# Patient Record
Sex: FEMALE | Race: WHITE | NOT HISPANIC OR LATINO | URBAN - METROPOLITAN AREA
[De-identification: names, ages, dates, MRNs, and addresses within clinical notes are randomized per-mention and may not be internally consistent; named-entity substitution may affect disease eponyms.]

---

## 2017-11-28 ENCOUNTER — ALLSCRIPTS OFFICE VISIT (OUTPATIENT)
Dept: OTHER | Facility: OTHER | Age: 27
End: 2017-11-28

## 2017-11-29 NOTE — PROGRESS NOTES
Assessment    1  Exercise-induced asthma (493 81) (J45 990)   2  Birth control (V25 9) (Z30 9)   3  Viral URI (465 9) (J06 9,B97 89)    Plan  Birth control    · MedroxyPROGESTERone Acetate 150 MG/ML Intramuscular Suspension(Depo-Provera); INJECT EVERY 12 WEEKS AS DIRECTED    Discussion/Summary  Discussion Summary:   1  viral URI - fluids, rest, nasal saline spray, NyQuil to sleep, reassurance  birth control - will obtain PAP from Sep 2016 and assuming normal will repeat in Sep 2018  Depo renewed and will give here when brought in  EIA -in college, recently asymptomatic  as neededSpet 2018 for PAP  Medication SE Review and Pt Understands Tx: Possible side effects of new medications were reviewed with the patient/guardian today  The treatment plan was reviewed with the patient/guardian  The patient/guardian understands and agrees with the treatment plan      Chief Complaint  Chief Complaint Free Text Note Form: Pt presents here as a np to be established;  due      History of Present Illness  HPI: Patient here to establish care  with a cold a few days ago, 4 days  Stuffy nose, runny eyes, few coughs, laryngitis, denies fever, chills  Denies n/v/d  Clear mucus  Taking NyQuil with relief  Depo, last PAP Sept 2016 in [place] which was normal  Needs refill and injection of Depo  Active Problems  1  Birth control (V25 9) (Z30 9)    Past Medical History  Active Problems And Past Medical History Reviewed: The active problems and past medical history were reviewed and updated today  Surgical History  1  History of Tonsillectomy  Surgical History Reviewed: The surgical history was reviewed and updated today  Family History  Mother    1  Family history of Diverticulitis   2  Denied: Family history of substance abuse   3  Denied: Family history of Mental health problem  Father    4  Family history of hypertension (V17 49) (Z82 49)   5  Denied: Family history of substance abuse   6   Denied: Family history of Mental health problem  Family History Reviewed: The family history was reviewed and updated today  Social History     · Alcohol use (V49 89) (Z78 9)   · Always uses seat belt   · Daily caffeine consumption, 2-3 servings a day   · Has smoke detectors   · Never a smoker   · No illicit drug use  Social History Reviewed: The social history was reviewed and updated today  The social history was reviewed and is unchanged  Current Meds   1  Depo-Provera 150 MG/ML Intramuscular Suspension; INJECT EVERY 12 WEEKS AS DIRECTED; Therapy: (Recorded:28Nov2017) to Recorded    Allergies  1  No Known Drug Allergies  2  No Known Environmental Allergies   3  No Known Food Allergies    Vitals  Vital Signs    Recorded: [id number] 04:54PM   Heart Rate 62   Respiration 14   Systolic 833   Diastolic 70   Height 5 ft 7 75 in   Weight 135 lb 8 0 oz   BMI Calculated 20 76   BSA Calculated 1 73       Physical Exam   Constitutional  General appearance: No acute distress, well appearing and well nourished  Ears, Nose, Mouth, and Throat  External inspection of ears and nose: Normal    Otoscopic examination: Tympanic membranes translucent with normal light reflex  Canals patent without erythema  Nasal mucosa, septum, and turbinates: Abnormal  -- turbinates inflamed, minimal congestion  Oropharynx: Normal with no erythema, edema, exudate or lesions  Pulmonary  Respiratory effort: No increased work of breathing or signs of respiratory distress  Auscultation of lungs: Clear to auscultation  Cardiovascular  Palpation of heart: Normal PMI, no thrills  Auscultation of heart: Normal rate and rhythm, normal S1 and S2, without murmurs  Lymphatic  Palpation of lymph nodes in neck: No lymphadenopathy     Psychiatric  Orientation to person, place, and time: Normal    Mood and affect: Normal          Results/Data  PHQ-2 Adult Depression Screening 28Nov2017 04:55PM User, Ahs     Test Name Result Flag Reference   PHQ-2 Adult Depression Score 0       Over the last two weeks, how often have you been bothered by any of the following problems?  Little interest or pleasure in doing things: Not at all - 0 Feeling down, depressed, or hopeless: Not at all - 0   PHQ-2 Adult Depression Screening Negative           Signatures   Electronically signed by : Calin Dos Santos, [address]; Nov 28 2017  8:53PM EST                       (Author)    Electronically signed by : KORY Martinez ; Nov 28 2017 10:09PM EST                       (Author)

## 2017-11-30 ENCOUNTER — GENERIC CONVERSION - ENCOUNTER (OUTPATIENT)
Dept: OTHER | Facility: OTHER | Age: 27
End: 2017-11-30

## 2018-01-12 VITALS
DIASTOLIC BLOOD PRESSURE: 70 MMHG | HEIGHT: 68 IN | RESPIRATION RATE: 14 BRPM | SYSTOLIC BLOOD PRESSURE: 110 MMHG | BODY MASS INDEX: 20.54 KG/M2 | HEART RATE: 62 BPM | WEIGHT: 135.5 LBS

## 2018-01-22 VITALS — WEIGHT: 140.4 LBS | HEIGHT: 68 IN | BODY MASS INDEX: 21.28 KG/M2

## 2018-03-01 ENCOUNTER — CLINICAL SUPPORT (OUTPATIENT)
Dept: FAMILY MEDICINE CLINIC | Facility: CLINIC | Age: 28
End: 2018-03-01
Payer: COMMERCIAL

## 2018-03-01 DIAGNOSIS — Z30.013 ENCOUNTER FOR INITIAL PRESCRIPTION OF INJECTABLE CONTRACEPTIVE: ICD-10-CM

## 2018-03-01 PROCEDURE — 96372 THER/PROPH/DIAG INJ SC/IM: CPT

## 2018-06-05 DIAGNOSIS — Z91.19 MEDICALLY NONCOMPLIANT: Primary | ICD-10-CM

## 2018-06-13 ENCOUNTER — CLINICAL SUPPORT (OUTPATIENT)
Dept: FAMILY MEDICINE CLINIC | Facility: CLINIC | Age: 28
End: 2018-06-13
Payer: COMMERCIAL

## 2018-06-13 VITALS — WEIGHT: 134 LBS | BODY MASS INDEX: 20.53 KG/M2

## 2018-06-13 DIAGNOSIS — Z30.013 ENCOUNTER FOR INITIAL PRESCRIPTION OF INJECTABLE CONTRACEPTIVE: Primary | ICD-10-CM

## 2018-06-13 LAB — B-HCG SERPL QL: NEGATIVE

## 2018-06-13 PROCEDURE — 96372 THER/PROPH/DIAG INJ SC/IM: CPT

## 2018-06-13 RX ORDER — MEDROXYPROGESTERONE ACETATE 150 MG/ML
150 INJECTION, SUSPENSION INTRAMUSCULAR ONCE
Status: COMPLETED | OUTPATIENT
Start: 2018-06-13 | End: 2018-06-13

## 2018-06-13 RX ADMIN — MEDROXYPROGESTERONE ACETATE 150 MG: 150 INJECTION, SUSPENSION INTRAMUSCULAR at 17:11
